# Patient Record
Sex: MALE | Race: WHITE
[De-identification: names, ages, dates, MRNs, and addresses within clinical notes are randomized per-mention and may not be internally consistent; named-entity substitution may affect disease eponyms.]

---

## 2019-06-23 ENCOUNTER — HOSPITAL ENCOUNTER (EMERGENCY)
Dept: HOSPITAL 11 - JP.ED | Age: 44
Discharge: HOME | End: 2019-06-23
Payer: COMMERCIAL

## 2019-06-23 DIAGNOSIS — I10: ICD-10-CM

## 2019-06-23 DIAGNOSIS — Z79.899: ICD-10-CM

## 2019-06-23 DIAGNOSIS — S62.630A: Primary | ICD-10-CM
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
## 2019-06-23 DIAGNOSIS — Z87.891: ICD-10-CM

## 2019-06-23 DIAGNOSIS — K21.9: ICD-10-CM

## 2019-06-23 NOTE — CRLCR
INDICATION:



Trauma, hand pain 



TECHNIQUE:



X-ray right hand, three views



COMPARISON:



None available



FINDINGS:



There is a nondisplaced fracture of the  2nd tuft. The alignment is normal. 

No other fracture is seen. The soft tissues unremarkable. No radiopaque 

foreign body is visualized. 



  



IMPRESSION:



Nondisplaced fracture of the 2nd tuft.



Dictated by Marielena Julio MD @ 6/23/2019 5:55:55 PM



Dictated by: Marielena Julio MD @ 06/23/2019 17:56:03



(Electronically Signed)

## 2019-06-23 NOTE — EDM.PDOC
ED HPI GENERAL MEDICAL PROBLEM





- General


Chief Complaint: Upper Extremity Injury/Pain


Stated Complaint: POSSIBLE BROKEN RT POINTER FINGER


Time Seen by Provider: 06/23/19 17:15


Source of Information: Reports: Patient


History Limitations: Reports: No Limitations





- History of Present Illness


INITIAL COMMENTS - FREE TEXT/NARRATIVE: 





This gentleman was working on a awning on an RV when the spring came loose 

flipped around and hit him in the right hand. Mostly it hit the distal phalanx 

of the index finger and then the mid part of the dorsum of the hand mostly over 

the fourth and fifth metatarsals.


  ** Right Hand


Pain Score (Numeric/FACES): 6





- Related Data


 Allergies











Allergy/AdvReac Type Severity Reaction Status Date / Time


 


No Known Allergies Allergy   Verified 06/23/19 17:17











Home Meds: 


 Home Meds





Lisinopril 10 mg PO DAILY 06/23/19 [History]


SUMAtriptan Succinate [Imitrex] 100 mg PO ASDIRECTED PRN 06/23/19 [History]


buPROPion [buPROPion XL] 150 mg PO DAILY 06/23/19 [History]











Past Medical History


Cardiovascular History: Reports: Hypertension


Gastrointestinal History: Reports: GERD


Musculoskeletal History: Reports: Other (See Below)


Other Musculoskeletal History: carpal tunnel


Neurological History: Reports: Migraines





Social & Family History





- Tobacco Use


Smoking Status *Q: Former Smoker


Used Tobacco, but Quit: Yes


Month/Year Tobacco Last Used: 6 years





- Caffeine Use


Caffeine Use: Reports: Coffee





- Recreational Drug Use


Recreational Drug Use: No





Review of Systems





- Review of Systems


Review Of Systems: ROS reveals no pertinent complaints other than HPI.





ED EXAM, GENERAL





- Physical Exam


Exam: See Below


Exam Limited By: No Limitations


General Appearance: Alert, WD/WN, Mild Distress


Extremities: Other (He has 2 small blood blisters or cutaneous hematomas to the 

distal phalanx of the right index finger there are no more than about 5 mm in 

diameter each. The there is a very slight subungual hematoma also. There is 

good range of motion of the finger. There is some mild tenderness to the dorsum 

of the hand as described but nothing that suggest a fracture.)





Course





- Vital Signs


Last Recorded V/S: 





 Last Vital Signs











Temp  36.1 C   06/23/19 17:15


 


Pulse  60   06/23/19 17:15


 


Resp  16   06/23/19 17:15


 


BP  144/93 H  06/23/19 17:15


 


Pulse Ox  100   06/23/19 17:15














- Orders/Labs/Meds


Orders: 





 Active Orders 24 hr











 Category Date Time Status


 


 Hand Comp Min 3V Rt [CR] Stat Exams  06/23/19 17:17 Taken














- Radiology Interpretation


Free Text/Narrative:: 





X-rays shows possibly a little fracture to the tuft of the index finger 

otherwise hand x-ray is negative





Departure





- Departure


Time of Disposition: 17:57


Disposition: Home, Self-Care 01


Condition: Fair


Clinical Impression: 


 Fracture of distal phalanx of index finger








- Discharge Information


Referrals: 


PCP,None [Primary Care Provider] - 


Additional Instructions: 


Keep the dressing on the finger for a few days just for protection. Don't pop 

the blood blisters just low albumin to dry and they'll form sort of their own 

bandage. For pain take the [place] 5/325 one or 2 every 4 hours #12. This 

medication can cause sedation and impair driving. There is a questionable 

fracture to the little tuft or ball of bone on the end of your index finger. 

This doesn't need any kind of treatment but will be painful for a few days.





- My Orders


Last 24 Hours: 





My Active Orders





06/23/19 17:17


Hand Comp Min 3V Rt [CR] Stat 














- Assessment/Plan


Last 24 Hours: 





My Active Orders





06/23/19 17:17


Hand Comp Min 3V Rt [CR] Stat